# Patient Record
Sex: FEMALE
[De-identification: names, ages, dates, MRNs, and addresses within clinical notes are randomized per-mention and may not be internally consistent; named-entity substitution may affect disease eponyms.]

---

## 2017-08-09 ENCOUNTER — RESULT REVIEW (OUTPATIENT)
Age: 72
End: 2017-08-09

## 2018-08-10 ENCOUNTER — RESULT REVIEW (OUTPATIENT)
Age: 73
End: 2018-08-10

## 2019-08-12 ENCOUNTER — RESULT REVIEW (OUTPATIENT)
Age: 74
End: 2019-08-12

## 2020-08-19 ENCOUNTER — RESULT REVIEW (OUTPATIENT)
Age: 75
End: 2020-08-19

## 2020-09-29 ENCOUNTER — RESULT REVIEW (OUTPATIENT)
Age: 75
End: 2020-09-29

## 2021-09-03 ENCOUNTER — APPOINTMENT (OUTPATIENT)
Dept: OBGYN | Facility: CLINIC | Age: 76
End: 2021-09-03
Payer: MEDICARE

## 2021-09-03 VITALS
DIASTOLIC BLOOD PRESSURE: 70 MMHG | BODY MASS INDEX: 23.22 KG/M2 | HEIGHT: 64 IN | WEIGHT: 136 LBS | SYSTOLIC BLOOD PRESSURE: 130 MMHG

## 2021-09-03 DIAGNOSIS — Z86.19 PERSONAL HISTORY OF OTHER INFECTIOUS AND PARASITIC DISEASES: ICD-10-CM

## 2021-09-03 DIAGNOSIS — Z80.0 FAMILY HISTORY OF MALIGNANT NEOPLASM OF DIGESTIVE ORGANS: ICD-10-CM

## 2021-09-03 DIAGNOSIS — R87.618 OTHER ABNORMAL CYTOLOGICAL FINDINGS ON SPECIMENS FROM CERVIX UTERI: ICD-10-CM

## 2021-09-03 DIAGNOSIS — N94.9 UNSPECIFIED CONDITION ASSOCIATED WITH FEMALE GENITAL ORGANS AND MENSTRUAL CYCLE: ICD-10-CM

## 2021-09-03 DIAGNOSIS — R30.0 DYSURIA: ICD-10-CM

## 2021-09-03 PROCEDURE — 87070 CULTURE OTHR SPECIMN AEROBIC: CPT

## 2021-09-03 PROCEDURE — G0101: CPT | Mod: GA

## 2021-09-03 PROCEDURE — 99000 SPECIMEN HANDLING OFFICE-LAB: CPT

## 2021-09-03 PROCEDURE — G0328 FECAL BLOOD SCRN IMMUNOASSAY: CPT | Mod: QW

## 2021-09-03 RX ORDER — CONJUGATED ESTROGENS 0.62 MG/G
0.62 CREAM VAGINAL
Qty: 2 | Refills: 2 | Status: ACTIVE | COMMUNITY
Start: 2021-09-03 | End: 1900-01-01

## 2021-09-06 LAB
CANDIDA VAG CYTO: NOT DETECTED
G VAGINALIS+PREV SP MTYP VAG QL MICRO: NOT DETECTED
HPV HIGH+LOW RISK DNA PNL CVX: NOT DETECTED
T VAGINALIS VAG QL WET PREP: NOT DETECTED

## 2021-09-08 ENCOUNTER — NON-APPOINTMENT (OUTPATIENT)
Age: 76
End: 2021-09-08

## 2021-09-13 LAB — CYTOLOGY CVX/VAG DOC THIN PREP: ABNORMAL

## 2021-09-14 ENCOUNTER — APPOINTMENT (OUTPATIENT)
Dept: OBGYN | Facility: CLINIC | Age: 76
End: 2021-09-14
Payer: MEDICARE

## 2021-09-14 VITALS — DIASTOLIC BLOOD PRESSURE: 80 MMHG | SYSTOLIC BLOOD PRESSURE: 120 MMHG

## 2021-09-14 DIAGNOSIS — R87.810 ATYPICAL SQUAMOUS CELLS OF UNDETERMINED SIGNIFICANCE ON CYTOLOGIC SMEAR OF CERVIX (ASC-US): ICD-10-CM

## 2021-09-14 DIAGNOSIS — R87.610 ATYPICAL SQUAMOUS CELLS OF UNDETERMINED SIGNIFICANCE ON CYTOLOGIC SMEAR OF CERVIX (ASC-US): ICD-10-CM

## 2021-09-14 PROCEDURE — 57454 BX/CURETT OF CERVIX W/SCOPE: CPT

## 2021-09-16 LAB — BACTERIA UR CULT: NORMAL

## 2021-09-28 ENCOUNTER — TRANSCRIPTION ENCOUNTER (OUTPATIENT)
Age: 76
End: 2021-09-28

## 2022-09-07 ENCOUNTER — APPOINTMENT (OUTPATIENT)
Dept: OBGYN | Facility: CLINIC | Age: 77
End: 2022-09-07

## 2022-09-07 VITALS
SYSTOLIC BLOOD PRESSURE: 140 MMHG | WEIGHT: 136 LBS | DIASTOLIC BLOOD PRESSURE: 80 MMHG | BODY MASS INDEX: 23.22 KG/M2 | HEIGHT: 64 IN

## 2022-09-07 PROCEDURE — G0328 FECAL BLOOD SCRN IMMUNOASSAY: CPT | Mod: GA,QW

## 2022-09-07 PROCEDURE — G0101: CPT | Mod: GA

## 2022-09-11 LAB — HPV HIGH+LOW RISK DNA PNL CVX: NOT DETECTED

## 2022-09-12 LAB — CYTOLOGY CVX/VAG DOC THIN PREP: NORMAL

## 2022-10-25 ENCOUNTER — APPOINTMENT (OUTPATIENT)
Dept: OBGYN | Facility: CLINIC | Age: 77
End: 2022-10-25

## 2022-10-25 VITALS — DIASTOLIC BLOOD PRESSURE: 88 MMHG | SYSTOLIC BLOOD PRESSURE: 143 MMHG

## 2022-10-25 DIAGNOSIS — R39.9 UNSPECIFIED SYMPTOMS AND SIGNS INVOLVING THE GENITOURINARY SYSTEM: ICD-10-CM

## 2022-10-25 LAB
BILIRUB UR QL STRIP: NORMAL
CLARITY UR: CLEAR
COLLECTION METHOD: NORMAL
GLUCOSE UR-MCNC: NORMAL
HCG UR QL: 0.2 EU/DL
HGB UR QL STRIP.AUTO: NORMAL
KETONES UR-MCNC: NORMAL
LEUKOCYTE ESTERASE UR QL STRIP: NORMAL
NITRITE UR QL STRIP: NORMAL
PH UR STRIP: 6
PROT UR STRIP-MCNC: NORMAL
SP GR UR STRIP: 1

## 2022-10-25 PROCEDURE — 99213 OFFICE O/P EST LOW 20 MIN: CPT | Mod: 25

## 2022-10-25 PROCEDURE — 81002 URINALYSIS NONAUTO W/O SCOPE: CPT

## 2022-10-25 RX ORDER — NITROFURANTOIN (MONOHYDRATE/MACROCRYSTALS) 25; 75 MG/1; MG/1
100 CAPSULE ORAL
Qty: 10 | Refills: 0 | Status: DISCONTINUED | COMMUNITY
Start: 2021-09-06 | End: 2022-10-25

## 2022-10-25 RX ORDER — ROSUVASTATIN CALCIUM 5 MG/1
5 TABLET, FILM COATED ORAL
Qty: 90 | Refills: 0 | Status: ACTIVE | COMMUNITY
Start: 2022-10-07

## 2022-10-25 RX ORDER — NITROFURANTOIN (MONOHYDRATE/MACROCRYSTALS) 25; 75 MG/1; MG/1
100 CAPSULE ORAL
Qty: 14 | Refills: 0 | Status: ACTIVE | COMMUNITY
Start: 2022-10-25 | End: 1900-01-01

## 2022-10-25 RX ORDER — ZOLPIDEM TARTRATE 5 MG/1
5 TABLET ORAL
Qty: 14 | Refills: 0 | Status: ACTIVE | COMMUNITY
Start: 2022-09-06

## 2022-10-25 RX ORDER — PHENAZOPYRIDINE 200 MG/1
200 TABLET, FILM COATED ORAL 3 TIMES DAILY
Qty: 6 | Refills: 0 | Status: ACTIVE | COMMUNITY
Start: 2022-10-25 | End: 1900-01-01

## 2022-10-27 LAB — BACTERIA UR CULT: NORMAL

## 2022-11-01 LAB — URINE CYTOLOGY: NORMAL

## 2023-09-19 ENCOUNTER — APPOINTMENT (OUTPATIENT)
Dept: OBGYN | Facility: CLINIC | Age: 78
End: 2023-09-19
Payer: MEDICARE

## 2023-09-19 VITALS
WEIGHT: 136 LBS | DIASTOLIC BLOOD PRESSURE: 92 MMHG | BODY MASS INDEX: 23.22 KG/M2 | SYSTOLIC BLOOD PRESSURE: 154 MMHG | HEIGHT: 64 IN

## 2023-09-19 DIAGNOSIS — N95.2 POSTMENOPAUSAL ATROPHIC VAGINITIS: ICD-10-CM

## 2023-09-19 DIAGNOSIS — Z01.419 ENCOUNTER FOR GYNECOLOGICAL EXAMINATION (GENERAL) (ROUTINE) W/OUT ABNORMAL FINDINGS: ICD-10-CM

## 2023-09-19 PROCEDURE — G0101: CPT | Mod: GA

## 2023-09-19 PROCEDURE — G0328 FECAL BLOOD SCRN IMMUNOASSAY: CPT | Mod: GA,QW

## 2023-09-19 PROCEDURE — 77080 DXA BONE DENSITY AXIAL: CPT

## 2023-09-19 RX ORDER — CONJUGATED ESTROGENS 0.62 MG/G
0.62 CREAM VAGINAL
Qty: 1 | Refills: 1 | Status: ACTIVE | COMMUNITY
Start: 2023-09-19 | End: 1900-01-01

## 2023-09-20 LAB — HPV HIGH+LOW RISK DNA PNL CVX: NOT DETECTED

## 2023-09-25 LAB — CYTOLOGY CVX/VAG DOC THIN PREP: NORMAL

## 2023-09-26 ENCOUNTER — RX RENEWAL (OUTPATIENT)
Age: 78
End: 2023-09-26

## 2023-09-26 RX ORDER — CONJUGATED ESTROGENS 0.62 MG/G
0.62 CREAM VAGINAL
Qty: 60 | Refills: 2 | Status: ACTIVE | COMMUNITY
Start: 2022-09-07 | End: 1900-01-01

## 2024-07-11 DIAGNOSIS — R92.30 DENSE BREASTS, UNSPECIFIED: ICD-10-CM

## 2024-07-11 DIAGNOSIS — Z12.31 ENCOUNTER FOR SCREENING MAMMOGRAM FOR MALIGNANT NEOPLASM OF BREAST: ICD-10-CM

## 2024-08-06 ENCOUNTER — NON-APPOINTMENT (OUTPATIENT)
Age: 79
End: 2024-08-06

## 2024-08-06 PROBLEM — R23.8 ERYTHEMATOUS PAPULES OF SKIN: Status: ACTIVE | Noted: 2024-08-06

## 2024-08-06 PROBLEM — Z84.0 FAMILY HISTORY OF ECZEMA: Status: ACTIVE | Noted: 2024-08-06

## 2024-08-06 PROBLEM — L57.8 SOLAR ELASTOSIS: Status: ACTIVE | Noted: 2024-08-06

## 2024-08-06 PROBLEM — L57.0 SOLAR KERATOSIS: Status: ACTIVE | Noted: 2024-08-06

## 2024-08-06 PROBLEM — Z85.828 HISTORY OF BASAL CELL CARCINOMA (BCC): Status: RESOLVED | Noted: 2024-08-06 | Resolved: 2024-08-06

## 2024-08-06 PROBLEM — L82.1 SEBORRHEIC KERATOSIS: Status: ACTIVE | Noted: 2024-08-06

## 2024-09-17 ENCOUNTER — TRANSCRIPTION ENCOUNTER (OUTPATIENT)
Age: 79
End: 2024-09-17

## 2024-09-18 ENCOUNTER — TRANSCRIPTION ENCOUNTER (OUTPATIENT)
Age: 79
End: 2024-09-18

## 2024-09-18 ENCOUNTER — OUTPATIENT (OUTPATIENT)
Dept: OUTPATIENT SERVICES | Facility: HOSPITAL | Age: 79
LOS: 1 days | End: 2024-09-18
Payer: MEDICARE

## 2024-09-18 VITALS
WEIGHT: 139.55 LBS | TEMPERATURE: 97 F | HEART RATE: 64 BPM | DIASTOLIC BLOOD PRESSURE: 79 MMHG | SYSTOLIC BLOOD PRESSURE: 131 MMHG | OXYGEN SATURATION: 96 % | RESPIRATION RATE: 14 BRPM | HEIGHT: 63 IN

## 2024-09-18 VITALS
HEART RATE: 70 BPM | OXYGEN SATURATION: 96 % | SYSTOLIC BLOOD PRESSURE: 118 MMHG | RESPIRATION RATE: 14 BRPM | DIASTOLIC BLOOD PRESSURE: 73 MMHG | TEMPERATURE: 98 F

## 2024-09-18 DIAGNOSIS — H25.11 AGE-RELATED NUCLEAR CATARACT, RIGHT EYE: ICD-10-CM

## 2024-09-18 DIAGNOSIS — Z90.722 ACQUIRED ABSENCE OF OVARIES, BILATERAL: Chronic | ICD-10-CM

## 2024-09-18 DIAGNOSIS — Z98.51 TUBAL LIGATION STATUS: Chronic | ICD-10-CM

## 2024-09-18 PROCEDURE — V2632: CPT

## 2024-09-18 PROCEDURE — 66984 XCAPSL CTRC RMVL W/O ECP: CPT | Mod: RT

## 2024-09-18 DEVICE — LENS IOL ACRYSOF SN60WF 21.0D
Type: IMPLANTABLE DEVICE | Site: RIGHT | Status: NON-FUNCTIONAL
Removed: 2024-09-18

## 2024-09-18 RX ORDER — TROPICAMIDE 1 %
1 DROPS OPHTHALMIC (EYE)
Refills: 0 | Status: COMPLETED | OUTPATIENT
Start: 2024-09-18 | End: 2024-09-18

## 2024-09-18 RX ORDER — CYCLOPENTOLATE HCL 1 %
1 DROPS OPHTHALMIC (EYE)
Refills: 0 | Status: COMPLETED | OUTPATIENT
Start: 2024-09-18 | End: 2024-09-18

## 2024-09-18 RX ORDER — ACETAMINOPHEN 325 MG/1
650 TABLET ORAL EVERY 6 HOURS
Refills: 0 | Status: DISCONTINUED | OUTPATIENT
Start: 2024-09-18 | End: 2024-10-02

## 2024-09-18 RX ORDER — PHENYLEPHRINE HYDROCHLORIDE 25 MG/ML
1 SOLUTION/ DROPS OPHTHALMIC
Refills: 0 | Status: COMPLETED | OUTPATIENT
Start: 2024-09-18 | End: 2024-09-18

## 2024-09-18 RX ORDER — KETOROLAC TROMETHAMINE 0.5 %
1 DROPS OPHTHALMIC (EYE)
Refills: 0 | Status: COMPLETED | OUTPATIENT
Start: 2024-09-18 | End: 2024-09-18

## 2024-09-18 RX ADMIN — Medication 1 DROP(S): at 08:18

## 2024-09-18 RX ADMIN — PHENYLEPHRINE HYDROCHLORIDE 1 DROP(S): 25 SOLUTION/ DROPS OPHTHALMIC at 08:18

## 2024-09-18 RX ADMIN — Medication 1 DROP(S): at 08:13

## 2024-09-18 RX ADMIN — Medication 1 DROP(S): at 08:08

## 2024-09-18 RX ADMIN — PHENYLEPHRINE HYDROCHLORIDE 1 DROP(S): 25 SOLUTION/ DROPS OPHTHALMIC at 08:08

## 2024-09-18 RX ADMIN — Medication 1 DROP(S): at 08:19

## 2024-09-18 RX ADMIN — Medication 1 DROP(S): at 08:14

## 2024-09-18 RX ADMIN — Medication 40 MILLILITER(S): at 08:26

## 2024-09-18 RX ADMIN — PHENYLEPHRINE HYDROCHLORIDE 1 DROP(S): 25 SOLUTION/ DROPS OPHTHALMIC at 08:13

## 2024-09-18 NOTE — ASU DISCHARGE PLAN (ADULT/PEDIATRIC) - ASU DC SPECIAL INSTRUCTIONSFT
Keep shield on eye until office visit today at 2 pm  Any problems call office at [phone]    OFFICE VISIT TODAY AT 2 pm  Bring Drops

## 2024-09-18 NOTE — BRIEF OPERATIVE NOTE - SPECIMENS
none Partially impaired: cannot see medication labels or newsprint, but can see obstacles in path, and the surrounding layout; can count fingers at arm's length Normal vision: sees adequately in most situations; can see medication labels, newsprint

## 2024-09-18 NOTE — ASU DISCHARGE PLAN (ADULT/PEDIATRIC) - NS MD DC FALL RISK RISK
For information on Fall & Injury Prevention, visit: [email or web address] OR  [email or web address] OR  https://www.cdc.gov/steadi/patient.html

## 2024-09-18 NOTE — ASU DISCHARGE PLAN (ADULT/PEDIATRIC) - DISCHARGE PLAN IS COMPLETE AND GIVEN TO PATIENT
: Yes
1. I was told the name of the doctor(s) who took care of my child while in the hospital.    2. I have been told about any important findings on my child's plan of care.    3. The doctor clearly explained my child's diagnosis and other possible diagnoses that were considered.    4. My child's doctor explained all the tests that were done and their results (if available). I understand that some of the test results may not be ready before we go home and I was told how I can get these results. I understand that a summary of my child's hospitalization and important test results will be shared with my child's outpatient doctor.    5. My child's doctor talked to me about what I need to do when we go home.    6. I understand what signs and symptoms to watch for. I understand what symptoms I would need to call my doctor for and/or return to the hospital.    7. I have the phone number to call the hospital for results and/or questions after I leave the hospital.

## 2024-09-22 ENCOUNTER — NON-APPOINTMENT (OUTPATIENT)
Age: 79
End: 2024-09-22

## 2024-09-23 ENCOUNTER — APPOINTMENT (OUTPATIENT)
Dept: OBGYN | Facility: CLINIC | Age: 79
End: 2024-09-23
Payer: MEDICARE

## 2024-09-23 VITALS
SYSTOLIC BLOOD PRESSURE: 177 MMHG | DIASTOLIC BLOOD PRESSURE: 96 MMHG | BODY MASS INDEX: 23.22 KG/M2 | HEIGHT: 64 IN | WEIGHT: 136 LBS

## 2024-09-23 VITALS — DIASTOLIC BLOOD PRESSURE: 82 MMHG | SYSTOLIC BLOOD PRESSURE: 148 MMHG

## 2024-09-23 VITALS — SYSTOLIC BLOOD PRESSURE: 160 MMHG | DIASTOLIC BLOOD PRESSURE: 90 MMHG

## 2024-09-23 DIAGNOSIS — N95.8 OTHER SPECIFIED MENOPAUSAL AND PERIMENOPAUSAL DISORDERS: ICD-10-CM

## 2024-09-23 DIAGNOSIS — Z01.419 ENCOUNTER FOR GYNECOLOGICAL EXAMINATION (GENERAL) (ROUTINE) W/OUT ABNORMAL FINDINGS: ICD-10-CM

## 2024-09-23 PROCEDURE — G0101: CPT

## 2024-09-23 RX ORDER — CONJUGATED ESTROGENS 0.62 MG/G
0.62 CREAM VAGINAL
Qty: 1 | Refills: 3 | Status: ACTIVE | COMMUNITY
Start: 2024-09-23 | End: 1900-01-01

## 2024-09-24 PROBLEM — E78.5 HYPERLIPIDEMIA, UNSPECIFIED: Chronic | Status: ACTIVE | Noted: 2024-09-17

## 2024-09-24 PROBLEM — R01.1 CARDIAC MURMUR, UNSPECIFIED: Chronic | Status: ACTIVE | Noted: 2024-09-17

## 2024-09-24 LAB — HPV HIGH+LOW RISK DNA PNL CVX: NOT DETECTED

## 2024-09-29 LAB — CYTOLOGY CVX/VAG DOC THIN PREP: NORMAL

## 2024-10-03 NOTE — HISTORY OF PRESENT ILLNESS
[Patient reported colonoscopy was normal] : Patient reported colonoscopy was normal [FreeTextEntry1] : 2024. ANGELA OMALLEY 79 year old female  LMP PM. She presents for an annual gyn exam.   She denies vaginal bleeding. She denies abdominal and pelvic pain, no vaginal discharge or vaginitis symptoms. She reports normal urination, no dysuria, no incontinence of urine. BM is normal per patient, no blood in stool or constipation/diarrhea.   She reports normal BPs at recent doctor's visits - 130/75 last week at time of cataract's sx, and was similar to PCP visit the week prior. She has f/u for cataract's sx in 2wk.  Pt is UTD with PCP, cardiology, and dermatology   MedHx: osteopenia w/ elevated hip FRAX, HPV positive , HSV, basal cell carcinoma, vaginal atrophy Meds: Statin, Premarin cream 1 gm vaginally twice weekly, Lipitor, Vit D3 ObHx:  x2 FT ,   GynHx: HPV positive , ASCUS (, ), HSV, vaginal atrophy, h/o ovarian cyst s/p BSO. No Hx of STI, Fibroids, or pelvic infections. SurgHx: BSO () for bilateral benign cysts, BTL (), colposcopy ECC benign, cervical bx benign, Mohs surgery, cataract's sx FamHx: Mother - colon ca 55, lung ca (70). MGM - breast ca. Father w/ basal and squamous cell carcinoma. Denies FHx of ovarian, uterine, pancreatic, or prostate cancer. All: NKDA Social: Alcohol 6 drink/week, former smoker (10yr, quit ). Denies drugs. Psych: PHQ9 = 0 [TextBox_4] : Pap smear 2021: ASCUS HPV not detected Pap smear 2020: ASCUS HPV +   [Mammogramdate] : 10/2023 [TextBox_19] : BIRADS 2, dense breasts. Rx given. [TextBox_25] : Advised pt to schedule [PapSmeardate] : 09/2023 [TextBox_31] : NILM, HPV neg. Done today. [BoneDensityDate] : 09/2023 [TextBox_37] : Osteopenia, 3.2 % hip FRAX. Repeat due 9/2025 or per endocrine [ColonoscopyDate] : 08/2024 [TextBox_43] : Pt reports GI MD did not recommend repeat

## 2024-10-03 NOTE — PHYSICAL EXAM
[Chaperone Present] : A chaperone was present in the examining room during all aspects of the physical examination [[id number]] : A chaperone was present during the pelvic exam. [Appropriately responsive] : appropriately responsive [Alert] : alert [No Acute Distress] : no acute distress [No Lymphadenopathy] : no lymphadenopathy [Regular Rate Rhythm] : regular rate rhythm [No Murmurs] : no murmurs [Clear to Auscultation B/L] : clear to auscultation bilaterally [Soft] : soft [Non-tender] : non-tender [Non-distended] : non-distended [No HSM] : No HSM [No Lesions] : no lesions [No Mass] : no mass [Oriented x3] : oriented x3 [Examination Of The Breasts] : a normal appearance [No Masses] : no breast masses were palpable [Vulvar Atrophy] : vulvar atrophy [Labia Majora] : normal [Labia Minora] : normal [Atrophy] : atrophy [Normal] : normal [Uterine Adnexae] : normal [FreeTextEntry2] : Goyo Schwartz [FreeTextEntry9] : guaiac deferred due to recent colonoscopy

## 2024-10-03 NOTE — PLAN
[FreeTextEntry1] : Routine Gyn Exam: BSA, calcium, vitamin D and exercise d/w pt Colonoscopy UTD - guaiac deferred due to recent colonoscopy Advised pt to see PCP and dermatologist annually  H/o abnl pap: Pap/HPV conducted today Reviewed 2021 Pap - ASCUS, HPV neg Reviewed 2020 Pap - ASCUS, HPV pos  Dense breasts: Rx given for mammogram and breast sonogram - mammo due 10/2024, sono due now  Osteopenia, elevated hip FRAX: DXA d/w patient D/w pt increased calcium in diet & Vit D 1000 U intake, & weight-bearing exercise (i.e. walking) for 30 min daily Advised pt to see endocrinologist - referral given    Elevated BP - 160/90: Repeated at end of visit - 148/82 Pt has f/u appt for cataract's sx in 2wk - to monitor BP Encouraged pt to see PCP   RTO in 1 year or PRN   Goyo MORRISSEY am scribing for and in the presence of Dr. Rodriguez in the following sections: HISTORY OF PRESENT ILLNESS, PAST MEDICAL/FAMILY/SOCIAL HISTORY, REVIEW OF SYSTEMS, PHYSICAL EXAM, ASSESSMENT/PLAN.

## 2024-10-03 NOTE — PHYSICAL EXAM
[Chaperone Present] : A chaperone was present in the examining room during all aspects of the physical examination [[id number]] : A chaperone was present during the pelvic exam. [Appropriately responsive] : appropriately responsive [Alert] : alert [No Acute Distress] : no acute distress [No Lymphadenopathy] : no lymphadenopathy [Regular Rate Rhythm] : regular rate rhythm [No Murmurs] : no murmurs [Clear to Auscultation B/L] : clear to auscultation bilaterally [Soft] : soft [Non-tender] : non-tender [Non-distended] : non-distended [No HSM] : No HSM [No Lesions] : no lesions [No Mass] : no mass [Oriented x3] : oriented x3 [Examination Of The Breasts] : a normal appearance [No Masses] : no breast masses were palpable [Vulvar Atrophy] : vulvar atrophy [Labia Majora] : normal [Labia Minora] : normal [Atrophy] : atrophy [Normal] : normal [Uterine Adnexae] : normal [FreeTextEntry2] : Goyo Schwartz [FreeTextEntry9] : guaiac deferred due to recent colonoscopy

## 2024-10-05 RX ORDER — SODIUM CHLORIDE IRRIG SOLUTION 0.9 %
1000 SOLUTION, IRRIGATION IRRIGATION
Refills: 0 | Status: DISCONTINUED | OUTPATIENT
Start: 2024-10-09 | End: 2024-10-23

## 2024-10-08 NOTE — ASU PATIENT PROFILE, ADULT - ABILITY TO HEAR (WITH HEARING AID OR HEARING APPLIANCE IF NORMALLY USED):
hearing aids at home/Mildly to Moderately Impaired: difficulty hearing in some environments or speaker may need to increase volume or speak distinctly hearing aids at home/Adequate: hears normal conversation without difficulty

## 2024-10-08 NOTE — ASU PATIENT PROFILE, ADULT - FALL HARM RISK - UNIVERSAL INTERVENTIONS
Bed in lowest position, wheels locked, appropriate side rails in place/Call bell, personal items and telephone in reach/Instruct patient to call for assistance before getting out of bed or chair/Non-slip footwear when patient is out of bed/[place] to call system/Physically safe environment - no spills, clutter or unnecessary equipment/Purposeful Proactive Rounding/Room/bathroom lighting operational, light cord in reach

## 2024-10-09 ENCOUNTER — OUTPATIENT (OUTPATIENT)
Dept: OUTPATIENT SERVICES | Facility: HOSPITAL | Age: 79
LOS: 1 days | End: 2024-10-09
Payer: MEDICARE

## 2024-10-09 ENCOUNTER — TRANSCRIPTION ENCOUNTER (OUTPATIENT)
Age: 79
End: 2024-10-09

## 2024-10-09 VITALS
RESPIRATION RATE: 16 BRPM | DIASTOLIC BLOOD PRESSURE: 80 MMHG | SYSTOLIC BLOOD PRESSURE: 123 MMHG | TEMPERATURE: 97 F | HEART RATE: 69 BPM | OXYGEN SATURATION: 98 %

## 2024-10-09 VITALS
DIASTOLIC BLOOD PRESSURE: 76 MMHG | RESPIRATION RATE: 15 BRPM | HEART RATE: 64 BPM | TEMPERATURE: 97 F | SYSTOLIC BLOOD PRESSURE: 123 MMHG | OXYGEN SATURATION: 97 %

## 2024-10-09 DIAGNOSIS — Z98.51 TUBAL LIGATION STATUS: Chronic | ICD-10-CM

## 2024-10-09 DIAGNOSIS — H25.12 AGE-RELATED NUCLEAR CATARACT, LEFT EYE: ICD-10-CM

## 2024-10-09 DIAGNOSIS — Z90.722 ACQUIRED ABSENCE OF OVARIES, BILATERAL: Chronic | ICD-10-CM

## 2024-10-09 DEVICE — LENS IOL ACRYSOF SN60WF 22.0D
Type: IMPLANTABLE DEVICE | Site: LEFT | Status: NON-FUNCTIONAL
Removed: 2024-10-09

## 2024-10-09 RX ORDER — KETOROLAC TROMETHAMINE 0.5 %
1 DROPS OPHTHALMIC (EYE)
Refills: 0 | Status: COMPLETED | OUTPATIENT
Start: 2024-10-09 | End: 2024-10-09

## 2024-10-09 RX ORDER — ACETAMINOPHEN 325 MG
650 TABLET ORAL EVERY 6 HOURS
Refills: 0 | Status: DISCONTINUED | OUTPATIENT
Start: 2024-10-09 | End: 2024-10-23

## 2024-10-09 RX ORDER — CYCLOPENTOLATE HCL 1 %
1 DROPS OPHTHALMIC (EYE)
Refills: 0 | Status: COMPLETED | OUTPATIENT
Start: 2024-10-09 | End: 2024-10-09

## 2024-10-09 RX ORDER — ACETAMINOPHEN 325 MG/1
1 TABLET ORAL
Refills: 0 | DISCHARGE

## 2024-10-09 RX ORDER — ROSUVASTATIN CALCIUM 10 MG/1
1 TABLET ORAL
Refills: 0 | DISCHARGE

## 2024-10-09 RX ORDER — PHENYLEPHRINE HYDROCHLORIDE 100 MG/ML
1 SOLUTION/ DROPS OPHTHALMIC
Refills: 0 | Status: COMPLETED | OUTPATIENT
Start: 2024-10-09 | End: 2024-10-09

## 2024-10-09 RX ORDER — TROPICAMIDE 1 %
1 DROPS OPHTHALMIC (EYE)
Refills: 0 | Status: COMPLETED | OUTPATIENT
Start: 2024-10-09 | End: 2024-10-09

## 2024-10-09 RX ORDER — OMEPRAZOLE 40 MG/1
1 CAPSULE, DELAYED RELEASE ORAL
Refills: 0 | DISCHARGE

## 2024-10-09 RX ADMIN — Medication 1 DROP(S): at 06:53

## 2024-10-09 RX ADMIN — PHENYLEPHRINE HYDROCHLORIDE 1 DROP(S): 100 SOLUTION/ DROPS OPHTHALMIC at 06:53

## 2024-10-09 RX ADMIN — PHENYLEPHRINE HYDROCHLORIDE 1 DROP(S): 100 SOLUTION/ DROPS OPHTHALMIC at 06:43

## 2024-10-09 RX ADMIN — Medication 1 DROP(S): at 06:43

## 2024-10-09 RX ADMIN — Medication 1 DROP(S): at 06:48

## 2024-10-09 RX ADMIN — PHENYLEPHRINE HYDROCHLORIDE 1 DROP(S): 100 SOLUTION/ DROPS OPHTHALMIC at 06:48

## 2024-10-09 RX ADMIN — Medication 40 MILLILITER(S): at 07:01

## 2024-10-09 NOTE — ASU DISCHARGE PLAN (ADULT/PEDIATRIC) - ASU DC SPECIAL INSTRUCTIONSFT
Keep shield on eye until office visit today at 215 pm  Any problems call office at [phone]    OFFICE VISIT TODAY  pm  Bring Drops

## 2024-10-09 NOTE — BRIEF OPERATIVE NOTE - NSICDXBRIEFPROCEDURE_GEN_ALL_CORE_FT
PROCEDURES:  Single stage extracapsular removal of cataract with insertion of intraocular lens prosthesis by phacoemulsification 09-Oct-2024 09:06:14  Quan Friedman

## 2024-11-06 ENCOUNTER — APPOINTMENT (OUTPATIENT)
Dept: DERMATOLOGY | Facility: CLINIC | Age: 79
End: 2024-11-06
Payer: MEDICARE

## 2024-11-06 DIAGNOSIS — L82.1 OTHER SEBORRHEIC KERATOSIS: ICD-10-CM

## 2024-11-06 DIAGNOSIS — Z85.828 PERSONAL HISTORY OF OTHER MALIGNANT NEOPLASM OF SKIN: ICD-10-CM

## 2024-11-06 DIAGNOSIS — D22.5 MELANOCYTIC NEVI OF TRUNK: ICD-10-CM

## 2024-11-06 DIAGNOSIS — L57.8 OTHER SKIN CHANGES DUE TO CHRONIC EXPOSURE TO NONIONIZING RADIATION: ICD-10-CM

## 2024-11-06 DIAGNOSIS — D18.01 HEMANGIOMA OF SKIN AND SUBCUTANEOUS TISSUE: ICD-10-CM

## 2024-11-06 PROCEDURE — 99213 OFFICE O/P EST LOW 20 MIN: CPT | Mod: 25

## 2024-11-06 PROCEDURE — 99203 OFFICE O/P NEW LOW 30 MIN: CPT | Mod: 25

## 2024-11-06 PROCEDURE — 17003 DESTRUCT PREMALG LES 2-14: CPT

## 2024-11-06 PROCEDURE — 17000 DESTRUCT PREMALG LESION: CPT

## 2024-11-20 PROCEDURE — 66982 XCAPSL CTRC RMVL CPLX WO ECP: CPT | Mod: LT

## 2024-11-20 PROCEDURE — V2632: CPT

## 2025-05-27 ENCOUNTER — APPOINTMENT (OUTPATIENT)
Dept: DERMATOLOGY | Facility: CLINIC | Age: 80
End: 2025-05-27
Payer: MEDICARE

## 2025-05-27 DIAGNOSIS — L82.1 OTHER SEBORRHEIC KERATOSIS: ICD-10-CM

## 2025-05-27 PROCEDURE — D0123: CPT

## (undated) DEVICE — AID SURG OPTH OCUCOAT 20MG/ML

## (undated) DEVICE — CARTRIDGE ALCON MONARCH II "B"

## (undated) DEVICE — SYR LUER LOK 20CC

## (undated) DEVICE — CANNULA BD & CO SUBTENONS

## (undated) DEVICE — DRSG STERISTRIPS 0.5 X 4"

## (undated) DEVICE — NUCLEUS HYDRODISSECTOR SAUTER 27G X 22MM

## (undated) DEVICE — CAPSULE POLISHER 23GX7/8"

## (undated) DEVICE — Device

## (undated) DEVICE — KNIFE SIDEPORT ANG W/ SAFETY 20G

## (undated) DEVICE — DRSG TEGADERM 2.5X3"

## (undated) DEVICE — SOL IRR POUR H2O 1000ML

## (undated) DEVICE — KNIFE FULL HANDLE ANGLE 2.75MM

## (undated) DEVICE — GLV 6.5 PROTEXIS (WHITE)

## (undated) DEVICE — NDL HYPO SAFE 18G X 1.5" (PINK)

## (undated) DEVICE — SUT VICRYL 10-0 12" CS160-8 DA